# Patient Record
Sex: MALE | Race: BLACK OR AFRICAN AMERICAN | NOT HISPANIC OR LATINO | Employment: FULL TIME | URBAN - METROPOLITAN AREA
[De-identification: names, ages, dates, MRNs, and addresses within clinical notes are randomized per-mention and may not be internally consistent; named-entity substitution may affect disease eponyms.]

---

## 2019-01-03 ENCOUNTER — TELEPHONE (OUTPATIENT)
Dept: UROLOGY | Facility: CLINIC | Age: 44
End: 2019-01-03

## 2019-01-03 NOTE — TELEPHONE ENCOUNTER
----- Message from Toni Yuliana sent at 1/3/2019  8:26 AM CST -----  Type:  Sooner Apoointment Request    Caller is requesting a sooner appointment.  Caller declined first available appointment listed below.  Caller will not accept being placed on the waitlist and is requesting a message be sent to doctor.    Name of Caller:  Pt   When is the first available appointment?  1/11/19  Symptoms: pt went to er and was disgnosed with a bladder infection, pt completed a urine test, and pt was advised to get his prostate checked.   Best Call Back Number:  [phone]  Additional Information:  Pt needs to be seen as soon as possible. Please call pt to schedule a sooner appointment as soon as possible.

## 2019-01-11 RX ORDER — CIPROFLOXACIN 250 MG/1
TABLET, FILM COATED ORAL
Refills: 0 | COMMUNITY
Start: 2018-12-22

## 2019-01-11 RX ORDER — PHENAZOPYRIDINE HYDROCHLORIDE 200 MG/1
TABLET, FILM COATED ORAL
Refills: 0 | COMMUNITY
Start: 2018-12-22

## 2019-01-11 RX ORDER — METOPROLOL SUCCINATE 50 MG/1
TABLET, EXTENDED RELEASE ORAL
Refills: 1 | COMMUNITY
Start: 2018-12-09

## 2023-10-13 ENCOUNTER — OUTSIDE PLACE OF SERVICE (OUTPATIENT)
Dept: ADMINISTRATIVE | Facility: OTHER | Age: 48
End: 2023-10-13
Payer: COMMERCIAL

## 2023-10-13 PROCEDURE — 99223 1ST HOSP IP/OBS HIGH 75: CPT | Mod: ,,, | Performed by: STUDENT IN AN ORGANIZED HEALTH CARE EDUCATION/TRAINING PROGRAM

## 2023-10-13 PROCEDURE — 99223 PR INITIAL HOSPITAL CARE,LEVL III: ICD-10-PCS | Mod: ,,, | Performed by: STUDENT IN AN ORGANIZED HEALTH CARE EDUCATION/TRAINING PROGRAM

## 2023-10-14 ENCOUNTER — OUTSIDE PLACE OF SERVICE (OUTPATIENT)
Dept: ADMINISTRATIVE | Facility: OTHER | Age: 48
End: 2023-10-14
Payer: COMMERCIAL

## 2023-10-14 PROCEDURE — 52356 PR CYSTO/URETERO W/LITHOTRIPSY: ICD-10-PCS | Mod: RT,,, | Performed by: STUDENT IN AN ORGANIZED HEALTH CARE EDUCATION/TRAINING PROGRAM

## 2023-10-14 PROCEDURE — 52356 CYSTO/URETERO W/LITHOTRIPSY: CPT | Mod: RT,,, | Performed by: STUDENT IN AN ORGANIZED HEALTH CARE EDUCATION/TRAINING PROGRAM

## 2023-10-15 ENCOUNTER — OUTSIDE PLACE OF SERVICE (OUTPATIENT)
Dept: ADMINISTRATIVE | Facility: OTHER | Age: 48
End: 2023-10-15
Payer: COMMERCIAL

## 2023-10-15 PROCEDURE — 99232 PR SUBSEQUENT HOSPITAL CARE,LEVL II: ICD-10-PCS | Mod: ,,, | Performed by: STUDENT IN AN ORGANIZED HEALTH CARE EDUCATION/TRAINING PROGRAM

## 2023-10-15 PROCEDURE — 99232 SBSQ HOSP IP/OBS MODERATE 35: CPT | Mod: ,,, | Performed by: STUDENT IN AN ORGANIZED HEALTH CARE EDUCATION/TRAINING PROGRAM

## 2025-02-24 ENCOUNTER — TELEPHONE (OUTPATIENT)
Dept: NEPHROLOGY | Facility: CLINIC | Age: 50
End: 2025-02-24

## 2025-02-24 NOTE — TELEPHONE ENCOUNTER
Called patient to schedule an appointment from a referral. New appointment made for 04/02/25 at 1100 with Dr. Mcnair.

## 2025-04-02 ENCOUNTER — TELEPHONE (OUTPATIENT)
Dept: NEPHROLOGY | Facility: CLINIC | Age: 50
End: 2025-04-02
Payer: COMMERCIAL

## 2025-04-02 ENCOUNTER — OFFICE VISIT (OUTPATIENT)
Dept: NEPHROLOGY | Facility: CLINIC | Age: 50
End: 2025-04-02
Payer: COMMERCIAL

## 2025-04-02 VITALS — DIASTOLIC BLOOD PRESSURE: 112 MMHG | HEART RATE: 73 BPM | OXYGEN SATURATION: 96 % | SYSTOLIC BLOOD PRESSURE: 160 MMHG

## 2025-04-02 DIAGNOSIS — I10 HYPERTENSION, UNSPECIFIED TYPE: Primary | ICD-10-CM

## 2025-04-02 DIAGNOSIS — R79.89 ELEVATED SERUM CREATININE: ICD-10-CM

## 2025-04-02 PROCEDURE — 99999 PR PBB SHADOW E&M-EST. PATIENT-LVL III: CPT | Mod: PBBFAC,,, | Performed by: STUDENT IN AN ORGANIZED HEALTH CARE EDUCATION/TRAINING PROGRAM

## 2025-04-02 PROCEDURE — 99204 OFFICE O/P NEW MOD 45 MIN: CPT | Mod: S$GLB,,, | Performed by: STUDENT IN AN ORGANIZED HEALTH CARE EDUCATION/TRAINING PROGRAM

## 2025-04-02 PROCEDURE — 1159F MED LIST DOCD IN RCRD: CPT | Mod: CPTII,S$GLB,, | Performed by: STUDENT IN AN ORGANIZED HEALTH CARE EDUCATION/TRAINING PROGRAM

## 2025-04-02 PROCEDURE — 1160F RVW MEDS BY RX/DR IN RCRD: CPT | Mod: CPTII,S$GLB,, | Performed by: STUDENT IN AN ORGANIZED HEALTH CARE EDUCATION/TRAINING PROGRAM

## 2025-04-02 PROCEDURE — 3077F SYST BP >= 140 MM HG: CPT | Mod: CPTII,S$GLB,, | Performed by: STUDENT IN AN ORGANIZED HEALTH CARE EDUCATION/TRAINING PROGRAM

## 2025-04-02 PROCEDURE — 4010F ACE/ARB THERAPY RXD/TAKEN: CPT | Mod: CPTII,S$GLB,, | Performed by: STUDENT IN AN ORGANIZED HEALTH CARE EDUCATION/TRAINING PROGRAM

## 2025-04-02 PROCEDURE — 3080F DIAST BP >= 90 MM HG: CPT | Mod: CPTII,S$GLB,, | Performed by: STUDENT IN AN ORGANIZED HEALTH CARE EDUCATION/TRAINING PROGRAM

## 2025-04-02 PROCEDURE — 3066F NEPHROPATHY DOC TX: CPT | Mod: CPTII,S$GLB,, | Performed by: STUDENT IN AN ORGANIZED HEALTH CARE EDUCATION/TRAINING PROGRAM

## 2025-04-02 RX ORDER — HYDROCHLOROTHIAZIDE 25 MG/1
25 TABLET ORAL DAILY
Qty: 30 TABLET | Refills: 11 | Status: SHIPPED | OUTPATIENT
Start: 2025-04-02 | End: 2026-03-28

## 2025-04-02 RX ORDER — VALSARTAN 320 MG/1
1 TABLET ORAL DAILY
COMMUNITY

## 2025-04-02 NOTE — PROGRESS NOTES
[facility]  Nephrology Clinic      Subjective:       HPI ID: Kale Alvarez is a 50 y.o. male who presents for new patient evaluation for chronic renal failure and hypertension.    Kale Alvarez is referred by Shagufta Soto MD to be evaluated for chronic renal failure.  He has ongoing conditions of hypertension >10 years ago, single episode of nephrolithiasis, daily etoh use, daily tobacco use. No available labs in system are available at time of consultation today.     In terms of his renal history, he denies hospitalizations for prior JACQUIE or JACQUIE requiring RRT. He reports single episode of nephrolithiasis req lithotripsy in 2023. No reported history of frequent or recurrent use of NSAIDs/COXI. No pertinent rheumatologic or AI disease history. Denies any pertinent family history of known kidney disease, or family members diagnosed with ESRD requiring dialysis.  Smoking Hx: started at age 16 years old, 1/3 ppd.   Other pertinent urologic history: other than the stones, denies  Fluid intake per 24hr: >100 oz of fluid per day. 48 oz of beer per day usually.  Occupation:     In regards to his hypertension, he follows with pcp for management. He has not required hospitalization for hypertension episodes. He occasionally has Headaches and palpitations believed related to his BP.     He has no uremic or urinary symptoms and is in his usual state of health.      During this visit, the patient and I reviewed his lab trends, discussed CKD epidemiology and risk factors, as well as general lifestyle and risk factor modifications to reduce his risk of progression to ESRD.     Review of Systems   Constitutional:  Negative for chills, diaphoresis and fever.   Respiratory:  Negative for cough and shortness of breath.    Cardiovascular:  Negative for chest pain and leg swelling.   Gastrointestinal:  Negative for abdominal pain, diarrhea, nausea and vomiting.   Genitourinary:  Negative for  difficulty urinating, dysuria and hematuria.   Musculoskeletal:  Negative for myalgias.   Neurological:  Negative for headaches.   Hematological:  Does not bruise/bleed easily.       The past medical, family and social histories were reviewed for this encounter.     No past medical history on file.  No past surgical history on file.  Social History[1]  Current Outpatient Medications   Medication Sig    ciprofloxacin HCl (CIPRO) 250 MG tablet TK 1 T PO BID. FINISH ALL OF THIS MEDICATION    metoprolol succinate (TOPROL-XL) 50 MG 24 hr tablet TK 1 T PO D    phenazopyridine (PYRIDIUM) 200 MG tablet TK 1 T PO TID PRN FOR URINARY DISCOMFORT    hydroCHLOROthiazide (HYDRODIURIL) 25 MG tablet Take 1 tablet (25 mg total) by mouth once daily.    valsartan (DIOVAN) 320 MG tablet Take 1 tablet by mouth once daily.     No current facility-administered medications for this visit.         Objective:   BP (!) 160/112 (BP Location: Right arm, Patient Position: Sitting)   Pulse 73   SpO2 96%      Physical Exam  Vitals reviewed.   Constitutional:       General: He is not in acute distress.     Appearance: Normal appearance.   HENT:      Head: Normocephalic and atraumatic.   Eyes:      General: No scleral icterus.     Extraocular Movements: Extraocular movements intact.   Cardiovascular:      Pulses: Normal pulses.      Heart sounds: Normal heart sounds.      No friction rub.   Pulmonary:      Effort: Pulmonary effort is normal. No respiratory distress.      Breath sounds: Normal breath sounds.   Abdominal:      General: Abdomen is flat.      Palpations: Abdomen is soft.   Musculoskeletal:         General: No swelling.      Cervical back: Normal range of motion. No tenderness.      Right lower leg: No edema.      Left lower leg: No edema.   Neurological:      General: No focal deficit present.      Mental Status: He is oriented to person, place, and time.      Motor: No weakness.   Psychiatric:         Mood and Affect: Mood normal.     "     Behavior: Behavior normal.         Assessment:   No results found for: "CREATININE", "BUN", "NA", "K", "CL", "CO2"  No results found for: "PTH", "CALCIUM", "CAION", "PHOS"  No results found for: "HCT"  No results found for: "UTPCR"    No results found for: "MICALBCREAT"    Lab results for 4/2/25 visit from pcp office scanned under media        1. Hypertension, unspecified type    2. Elevated serum creatinine        Plan:   Return to clinic in 2-3 months  Labs for next visit include Cbc, cmp, mg, phos, pth, ua, uacr, upcr, US renal dopple  .Baseline creatinine is TBD.    Elevated serum creatinine plan   -EGFR adjusted for body surface area is closer to 66 mL/min, possibly CKD stage 2   -send for proteinuria quantification and assessment   -check retroperitoneal ultrasound, we will add Doppler due to history of early-onset hypertension and familial hypertension  -on RASI for CKD-MACE risk reduction, proteinuria reduction and angie-protection  -continue discussion and adjustment of modifiable risk factors. Educated patient on the importance of BP, glycemic and lipid control   -avoid dehydration    -electrolytes reviewed.  Add HCTZ today on daily basis in conjunction with his ARB.  Should help with blood pressure management moving forward with him.    -if additional proteinuria control as needed/warranted we will discuss SGLT2 inhibitor at follow up visits    __________________________  Aaron Mcnair MD  [facility]    Part of this note has been created using [employer] dictation system. Errors in transcription may not be completely avoided.           [1]   Social History  Socioeconomic History    Marital status: Single   Tobacco Use    Smoking status: Every Day     Current packs/day: 0.50     Average packs/day: 0.5 packs/day for 34.2 years (17.1 ttl pk-yrs)     Types: Cigarettes     Start date: 1991    Smokeless tobacco: Never   Substance and Sexual Activity    Alcohol use: Yes     " Alcohol/week: 40.0 standard drinks of alcohol     Types: 40 Cans of beer per week    Drug use: Never    Sexual activity: Yes

## 2025-04-02 NOTE — TELEPHONE ENCOUNTER
"Called and spoke to Kathie with Dr. Soto's office. Requested recent labs be faxed. Provided office fax number [phone]. She verbalized understanding, stating," I'll send it over now."  "

## 2025-06-25 ENCOUNTER — TELEPHONE (OUTPATIENT)
Dept: NEPHROLOGY | Facility: CLINIC | Age: 50
End: 2025-06-25
Payer: COMMERCIAL

## 2025-06-25 NOTE — TELEPHONE ENCOUNTER
Called pt. To verify appt. He stated he needs to cx. And will not be coming because Insurance is out of network. He will find a doctor in network. He also, stated someone from [facility] called him last week about his Insurance out of network.